# Patient Record
Sex: FEMALE | Race: WHITE | NOT HISPANIC OR LATINO | URBAN - METROPOLITAN AREA
[De-identification: names, ages, dates, MRNs, and addresses within clinical notes are randomized per-mention and may not be internally consistent; named-entity substitution may affect disease eponyms.]

---

## 2022-06-28 ENCOUNTER — EMERGENCY (EMERGENCY)
Facility: HOSPITAL | Age: 22
LOS: 1 days | Discharge: HOME | End: 2022-06-28
Admitting: EMERGENCY MEDICINE

## 2022-06-28 VITALS
WEIGHT: 220.02 LBS | HEIGHT: 67 IN | RESPIRATION RATE: 18 BRPM | SYSTOLIC BLOOD PRESSURE: 113 MMHG | HEART RATE: 103 BPM | OXYGEN SATURATION: 95 % | DIASTOLIC BLOOD PRESSURE: 74 MMHG | TEMPERATURE: 98 F

## 2022-06-28 VITALS
HEART RATE: 94 BPM | SYSTOLIC BLOOD PRESSURE: 118 MMHG | DIASTOLIC BLOOD PRESSURE: 75 MMHG | OXYGEN SATURATION: 96 % | TEMPERATURE: 98 F | RESPIRATION RATE: 17 BRPM

## 2022-06-28 PROCEDURE — 73610 X-RAY EXAM OF ANKLE: CPT | Mod: 26,RT

## 2022-06-28 PROCEDURE — 99283 EMERGENCY DEPT VISIT LOW MDM: CPT | Mod: 25

## 2022-06-28 NOTE — ED PROVIDER NOTE - OBJECTIVE STATEMENT
Patient presents with L ankle pain and abrasions after twisting L ankle while stepping on an uneven surface and falling on BL knees. denies head trauma, focal weakness. able to partial weight bear. last tetanus 3 years ago

## 2022-06-28 NOTE — ED ADULT NURSE NOTE - OBJECTIVE STATEMENT
Pt aox3 BIBEMS, R ankle pain s/p trip + swelling. no deformity, road rash on left leg. Denies hs or loc

## 2022-06-28 NOTE — ED PROVIDER NOTE - PHYSICAL EXAMINATION
superficial abrasions over the RLE, L knee, no active bleeding  R ankle: edema over the lateral malleolus, able to move all toes.   DP pulses equal, cap refill <2 sec

## 2022-06-28 NOTE — ED PROVIDER NOTE - CHIEF COMPLAINT
Transfer acceptance note from [facility] to [facility] [address]    Patient is a 26y old  Female who presents with a chief complaint of     HPI:      Allergies    No Known Allergies    Intolerances      Home Medications:      SOCIAL HX:     Smoking          ETOH/Illicit drugs          Occupation    PAST MEDICAL & SURGICAL HISTORY:  Anxiety  Depression      FAMILY HISTORY:  :    No known cardiovascular or pulmonary family history     ROS:  See HPI     PHYSICAL EXAM    ICU Vital Signs Last 24 Hrs  T(C): 36.2 (22 Aug 2019 23:30), Max: 36.9 (22 Aug 2019 18:43)  T(F): 97.2 (22 Aug 2019 23:30), Max: 98.5 (22 Aug 2019 18:43)  HR: 72 (22 Aug 2019 23:06) (67 - 89)  BP: 124/67 (22 Aug 2019 23:06) (124/67 - 138/89)  BP(mean): 88 (22 Aug 2019 23:06) (88 - 88)  ABP: --  ABP(mean): --  RR: 18 (22 Aug 2019 23:06) (16 - 18)  SpO2: 97% (22 Aug 2019 23:06) (96% - 100%)      General: Not in distress  HEENT:  MAY              Lymphatic system: No LN  Lungs: Bilateral BS  Cardiovascular: Regular  Gastrointestinal: Soft, Positive BS  Musculoskeletal: No clubbing.  Moves all extremities.    Skin: Warm.  Intact  Neurological: No motor or sensory deficit         LABS:                          14.6   6.0   )-----------( 235      ( 22 Aug 2019 19:27 )             41.5                                               08-22    141  |  104  |  15  ----------------------------<  90  3.4<L>   |  27  |  0.89    Ca    9.6      22 Aug 2019 19:27    TPro  8.1  /  Alb  4.7  /  TBili  0.6  /  DBili  x   /  AST  37  /  ALT  48<H>  /  AlkPhos  74  08-22                                             Urinalysis Basic - ( 22 Aug 2019 19:45 )    Color: Yellow / Appearance: Clear / S.025 / pH: x  Gluc: x / Ketone: NEGATIVE  / Bili: NEGATIVE / Urobili: 0.2 E.U./dL   Blood: x / Protein: 30 mg/dL / Nitrite: NEGATIVE   Leuk Esterase: NEGATIVE / RBC: < 5 /HPF / WBC < 5 /HPF   Sq Epi: x / Non Sq Epi: 5-10 /HPF / Bacteria: Present /HPF                                                  LIVER FUNCTIONS - ( 22 Aug 2019 19:27 )  Alb: 4.7 g/dL / Pro: 8.1 g/dL / ALK PHOS: 74 U/L / ALT: 48 U/L / AST: 37 U/L / GGT: x                                                                                                                                           CXR:    ECHO:    MEDICATIONS  (STANDING):  sodium chloride 0.9%. 1000 milliLiter(s) (100 mL/Hr) IV Continuous <Continuous>    MEDICATIONS  (PRN):  acetaminophen    Suspension .. 650 milliGRAM(s) Oral every 6 hours PRN Mild Pain (1 - 3)      A&P:            Pt seen and examined. Case discussed with Dr. Carlton.  Remainder of plan as discussed in H&P. Transfer acceptance note from [facility] to [facility] [address]    HPI:  Ms. Barger is a 26F with a PMH of anxiety and depression on Zoloft and Lorazepam who presents the ED for a seizure. As per patient's sister who witnessed the seizure, pt was sitting on the couch at their cousin's house and started to lean to one side and make groaning sounds, this was followed by GTC activity, pt was red then turned blue. 911 was immediately called. Pt then sat up and looked confused with difficulty speaking for approximately 10 minutes. Pt's sister is not sure of how long the seizure lasted but states pt did not hit her head and was on the couch the entire time. Pt recently stopped taking Lorazepam 1mg once/day, which she takes for anxiety, and completed her tapering regimen (Lorazepam .5mg for 1-2 weeks) 4 days ago.  Pt does not remember any thing but does remember feeling lightheaded and anxious prior to arriving her cousin's house. Of note, she has not been sleeping well for the last 2 days due to her job. She currently has a headache to the back of her head, feels anxious, and tongue pain. Denies urinary and bowel incontinence, visual and auditory hallucinations, and N/V/D, It was agreed by the patient and her psychiatrist to discontinue Lorazepam 2-3 weeks ago. She has done this in the past where she would take Lorazepam for 5 months and discontinue for a period time before starting Lorazepam again, she states feels long term use clouds her memory. Pt occasionally vapes and drinks approx 3x/week. When she drinks, she usually drinks 3 drinks but more on the weekends. Last alcoholic drink was yesterday evening where she drank 2 glasses of wine. Denies illicit drug use but does endorse eating a marijuana cookie 3 weeks ago. Pt denies a history of seizures.                        Allergies    No Known Allergies    Intolerances      Home Medications:      SOCIAL HX:     Smoking          ETOH/Illicit drugs          Occupation    PAST MEDICAL & SURGICAL HISTORY:  Anxiety  Depression      FAMILY HISTORY:  :    No known cardiovascular or pulmonary family history     ROS:  See HPI     PHYSICAL EXAM    ICU Vital Signs Last 24 Hrs  T(C): 36.2 (22 Aug 2019 23:30), Max: 36.9 (22 Aug 2019 18:43)  T(F): 97.2 (22 Aug 2019 23:30), Max: 98.5 (22 Aug 2019 18:43)  HR: 72 (22 Aug 2019 23:06) (67 - 89)  BP: 124/67 (22 Aug 2019 23:06) (124/67 - 138/89)  BP(mean): 88 (22 Aug 2019 23:06) (88 - 88)  ABP: --  ABP(mean): --  RR: 18 (22 Aug 2019 23:06) (16 - 18)  SpO2: 97% (22 Aug 2019 23:06) (96% - 100%)      General: Not in distress  HEENT:  MAY              Lymphatic system: No LN  Lungs: Bilateral BS  Cardiovascular: Regular  Gastrointestinal: Soft, Positive BS  Musculoskeletal: No clubbing.  Moves all extremities.    Skin: Warm.  Intact  Neurological: No motor or sensory deficit         LABS:                          14.6   6.0   )-----------( 235      ( 22 Aug 2019 19:27 )             41.5                                               0822    141  |  104  |  15  ----------------------------<  90  3.4<L>   |  27  |  0.89    Ca    9.6      22 Aug 2019 19:27    TPro  8.1  /  Alb  4.7  /  TBili  0.6  /  DBili  x   /  AST  37  /  ALT  48<H>  /  AlkPhos  74                                               Urinalysis Basic - ( 22 Aug 2019 19:45 )    Color: Yellow / Appearance: Clear / S.025 / pH: x  Gluc: x / Ketone: NEGATIVE  / Bili: NEGATIVE / Urobili: 0.2 E.U./dL   Blood: x / Protein: 30 mg/dL / Nitrite: NEGATIVE   Leuk Esterase: NEGATIVE / RBC: < 5 /HPF / WBC < 5 /HPF   Sq Epi: x / Non Sq Epi: 5-10 /HPF / Bacteria: Present /HPF                                                  LIVER FUNCTIONS - ( 22 Aug 2019 19:27 )  Alb: 4.7 g/dL / Pro: 8.1 g/dL / ALK PHOS: 74 U/L / ALT: 48 U/L / AST: 37 U/L / GGT: x                                                                                                                                           CXR:    ECHO:    MEDICATIONS  (STANDING):  sodium chloride 0.9%. 1000 milliLiter(s) (100 mL/Hr) IV Continuous <Continuous>    MEDICATIONS  (PRN):  acetaminophen    Suspension .. 650 milliGRAM(s) Oral every 6 hours PRN Mild Pain (1 - 3)      A&P:            Pt seen and examined. Case discussed with Dr. Carlton.  Remainder of plan as discussed in H&P. Transfer acceptance note from [facility] to [facility] [address]    HPI:  Ms. Barger is a 26F with a PMH of anxiety and depression on Zoloft and Lorazepam who presented to the ED after a witnessed seizure. Pt states that she was at her baseline when she woke up on Thursday morning. She went to work and then went to her cousin's house. Approximately 6pm on       for a seizure. As per patient's sister who witnessed the seizure, pt was sitting on the couch at their cousin's house and started to lean to one side and make groaning sounds, this was followed by GTC activity, pt was red then turned blue. 911 was immediately called. Pt then sat up and looked confused with difficulty speaking for approximately 10 minutes. Pt's sister is not sure of how long the seizure lasted but states pt did not hit her head and was on the couch the entire time. Pt recently stopped taking Lorazepam 1mg once/day, which she takes for anxiety, and completed her tapering regimen (Lorazepam .5mg for 1-2 weeks) 4 days ago.  Pt does not remember any thing but does remember feeling lightheaded and anxious prior to arriving her cousin's house. Of note, she has not been sleeping well for the last 2 days due to her job. She currently has a headache to the back of her head, feels anxious, and tongue pain. Denies urinary and bowel incontinence, visual and auditory hallucinations, and N/V/D, It was agreed by the patient and her psychiatrist to discontinue Lorazepam 2-3 weeks ago. She has done this in the past where she would take Lorazepam for 5 months and discontinue for a period time before starting Lorazepam again, she states feels long term use clouds her memory. Pt occasionally vapes and drinks approx 3x/week. When she drinks, she usually drinks 3 drinks but more on the weekends. Last alcoholic drink was yesterday evening where she drank 2 glasses of wine. Denies illicit drug use but does endorse eating a marijuana cookie 3 weeks ago. Pt denies a history of seizures.                        Allergies    No Known Allergies    Intolerances      Home Medications:      SOCIAL HX:     Smoking          ETOH/Illicit drugs          Occupation    PAST MEDICAL & SURGICAL HISTORY:  Anxiety  Depression      FAMILY HISTORY:  :    No known cardiovascular or pulmonary family history     ROS:  See HPI     PHYSICAL EXAM    ICU Vital Signs Last 24 Hrs  T(C): 36.2 (22 Aug 2019 23:30), Max: 36.9 (22 Aug 2019 18:43)  T(F): 97.2 (22 Aug 2019 23:30), Max: 98.5 (22 Aug 2019 18:43)  HR: 72 (22 Aug 2019 23:06) (67 - 89)  BP: 124/67 (22 Aug 2019 23:06) (124/67 - 138/89)  BP(mean): 88 (22 Aug 2019 23:06) (88 - 88)  ABP: --  ABP(mean): --  RR: 18 (22 Aug 2019 23:06) (16 - 18)  SpO2: 97% (22 Aug 2019 23:06) (96% - 100%)      General: Not in distress  HEENT:  MAY              Lymphatic system: No LN  Lungs: Bilateral BS  Cardiovascular: Regular  Gastrointestinal: Soft, Positive BS  Musculoskeletal: No clubbing.  Moves all extremities.    Skin: Warm.  Intact  Neurological: No motor or sensory deficit         LABS:                          14.6   6.0   )-----------( 235      ( 22 Aug 2019 19:27 )             41.5                                               08-22    141  |  104  |  15  ----------------------------<  90  3.4<L>   |  27  |  0.89    Ca    9.6      22 Aug 2019 19:27    TPro  8.1  /  Alb  4.7  /  TBili  0.6  /  DBili  x   /  AST  37  /  ALT  48<H>  /  AlkPhos  74                                               Urinalysis Basic - ( 22 Aug 2019 19:45 )    Color: Yellow / Appearance: Clear / S.025 / pH: x  Gluc: x / Ketone: NEGATIVE  / Bili: NEGATIVE / Urobili: 0.2 E.U./dL   Blood: x / Protein: 30 mg/dL / Nitrite: NEGATIVE   Leuk Esterase: NEGATIVE / RBC: < 5 /HPF / WBC < 5 /HPF   Sq Epi: x / Non Sq Epi: 5-10 /HPF / Bacteria: Present /HPF                                                  LIVER FUNCTIONS - ( 22 Aug 2019 19:27 )  Alb: 4.7 g/dL / Pro: 8.1 g/dL / ALK PHOS: 74 U/L / ALT: 48 U/L / AST: 37 U/L / GGT: x                                                                                                                                           CXR:    ECHO:    MEDICATIONS  (STANDING):  sodium chloride 0.9%. 1000 milliLiter(s) (100 mL/Hr) IV Continuous <Continuous>    MEDICATIONS  (PRN):  acetaminophen    Suspension .. 650 milliGRAM(s) Oral every 6 hours PRN Mild Pain (1 - 3)      A&P:            Pt seen and examined. Case discussed with Dr. Carlton.  Remainder of plan as discussed in H&P. Transfer acceptance note from [facility] to [facility] [address]    HPI:  Ms. Barger is a 26F with a PMH of anxiety, depression, EtOH abuse hx on Zoloft and Lorazepam who presented to the ED after a witnessed seizure.  Pt states that she was at her baseline when she woke up on Thursday morning. She went to work and then went to her cousin's house. Approximately 6pm on  she stated that she felt strange and then had a witnessed tonic clonic seizure which lasted for approx 5 min with a 10 min post ictal period. She endorses tongue biting (denies blood in her mouth). Denies fecal/urinary incontinence, LOC or hitting her head. Pt does not recall the details of her seizure; details of seizure were confirmed by her sister who witnessed the seizure and was at bedside during the interview. Pt's sister states that pt was on the couch, leaned to one side, with tonic clonic movements in her bilateral upper and lower extremities, during which her face turned blue. She did not hit her head during the seizure. Pt was post ictal for approximately 10 min (confusion and word finding difficulties) EMS was called immediately and pt was brought to [facility]. Of note, pt had been taking Lorazepam 0.5 - 1mg PRN at bedtime for the past 5 months, but recently stopped and started a self taper. Her last dose of Lorazepam 0.5 mg was . She endorses regular EtOH use. She drinks 6-8 glasses of wine each weekend night and approx 2 glasses of wine, 2 weekday nights. Her last drink of EtoH was 7pm . She vapes and endorses occasional marijuana use. She does not have a seizure hx and has not had a seizure in the past. Pt transferred to [address] [facility] for post seizure monitoring.    [facility] vitals: T: 98.5, HR: 89, BP: 138/89, RR: 18, SpO2: 96% RA, CIWA 11, repeat CIWA 7  [facility] meds: Librium 50 mg, Ativan 0.5 mg x2  [facility] imaging: CT head negative      Allergies    No Known Allergies    Intolerances      Home Medications:      SOCIAL HX:     Smoking: vapes         ETOH/Illicit drugs: 6-8 glasses of wine each weekend night and approx 2 glasses of wine, 2 weekday nights. Her last drink of EtoH was 7pm ; occasional marijuana use          Occupation: [employer]    PAST MEDICAL & SURGICAL HISTORY:  Anxiety  Depression      FAMILY HISTORY:  :    No known cardiovascular or pulmonary family history     ROS:  See HPI     PHYSICAL EXAM    ICU Vital Signs Last 24 Hrs  T(C): 36.2 (22 Aug 2019 23:30), Max: 36.9 (22 Aug 2019 18:43)  T(F): 97.2 (22 Aug 2019 23:30), Max: 98.5 (22 Aug 2019 18:43)  HR: 72 (22 Aug 2019 23:06) (67 - 89)  BP: 124/67 (22 Aug 2019 23:06) (124/67 - 138/89)  BP(mean): 88 (22 Aug 2019 23:06) (88 - 88)  ABP: --  ABP(mean): --  RR: 18 (22 Aug 2019 23:06) (16 - 18)  SpO2: 97% (22 Aug 2019 23:06) (96% - 100%)      General: Not in distress  HEENT:  PERRLA              Lymphatic system: No LN  Lungs: Bilateral BS  Cardiovascular: Regular  Gastrointestinal: Soft, Positive BS  Musculoskeletal: No clubbing.  Moves all extremities.    Skin: Warm.  Intact  Neurological: No motor or sensory deficit         LABS:                          14.6   6.0   )-----------( 235      ( 22 Aug 2019 19:27 )             41.5                                               -22    141  |  104  |  15  ----------------------------<  90  3.4<L>   |  27  |  0.89    Ca    9.6      22 Aug 2019 19:27    TPro  8.1  /  Alb  4.7  /  TBili  0.6  /  DBili  x   /  AST  37  /  ALT  48<H>  /  AlkPhos  74  -                                             Urinalysis Basic - ( 22 Aug 2019 19:45 )    Color: Yellow / Appearance: Clear / S.025 / pH: x  Gluc: x / Ketone: NEGATIVE  / Bili: NEGATIVE / Urobili: 0.2 E.U./dL   Blood: x / Protein: 30 mg/dL / Nitrite: NEGATIVE   Leuk Esterase: NEGATIVE / RBC: < 5 /HPF / WBC < 5 /HPF   Sq Epi: x / Non Sq Epi: 5-10 /HPF / Bacteria: Present /HPF                                                  LIVER FUNCTIONS - ( 22 Aug 2019 19:27 )  Alb: 4.7 g/dL / Pro: 8.1 g/dL / ALK PHOS: 74 U/L / ALT: 48 U/L / AST: 37 U/L / GGT: x                                                                                                   CT head: no acute findings                                        MEDICATIONS  (STANDING):  sodium chloride 0.9%. 1000 milliLiter(s) (100 mL/Hr) IV Continuous <Continuous>    MEDICATIONS  (PRN):  acetaminophen    Suspension .. 650 milliGRAM(s) Oral every 6 hours PRN Mild Pain (1 - 3)      A&P:    26F with a PMH of anxiety, depression, EtOH abuse hx on Zoloft and Lorazepam who presented to the ED after a witnessed GTC seizure.    #Seizure  -Pt with witnessed GTC in setting of self taper of benzodiazepines in setting of EtOH abuse  - now s/p Librium 50 mg, Ativan 0.5 mg x2  - Pt without prior seizure hx  - Etiology of seizure is likely benzodiazepine taper in setting of EtOH abuse, which resulted in decreased seizure threshold  -           Pt seen and examined. Case discussed with Dr. Carlton.  Remainder of plan as discussed in H&P. Transfer acceptance note from [facility] to [facility] [address]    HPI:  Ms. Barger is a 26F with a PMH of anxiety, depression, EtOH abuse hx on Zoloft and Lorazepam who presented to the ED after a witnessed seizure.  Pt states that she was at her baseline when she woke up on Thursday morning. She went to work and then went to her cousin's house. Approximately 6pm on  she stated that she felt strange and then had a witnessed tonic clonic seizure which lasted for approx 5 min with a 10 min post ictal period. She endorses tongue biting (denies blood in her mouth). Denies fecal/urinary incontinence, LOC or hitting her head. Pt does not recall the details of her seizure; details of seizure were confirmed by her sister who witnessed the seizure and was at bedside during the interview. Pt's sister states that pt was on the couch, leaned to one side, with tonic clonic movements in her bilateral upper and lower extremities, during which her face turned blue. She did not hit her head during the seizure. Pt was post ictal for approximately 10 min (confusion and word finding difficulties) EMS was called immediately and pt was brought to [facility]. Of note, pt had been taking Lorazepam 0.5 - 1mg PRN at bedtime for the past 5 months, but recently stopped and started a self taper. Her last dose of Lorazepam 0.5 mg was . She endorses regular EtOH use. She drinks 6-8 glasses of wine each weekend night and approx 2 glasses of wine, 2 weekday nights. Her last drink of EtoH was 7pm . She vapes and endorses occasional marijuana use. She does not have a seizure hx and has not had a seizure in the past. Pt transferred to [address] [facility] for post seizure monitoring.    [facility] vitals: T: 98.5, HR: 89, BP: 138/89, RR: 18, SpO2: 96% RA, CIWA 11, repeat CIWA 7  [facility] meds: Librium 50 mg, Ativan 0.5 mg x2  [facility] imaging: CT head negative      Allergies    No Known Allergies    Intolerances      Home Medications:      SOCIAL HX:     Smoking: vapes         ETOH/Illicit drugs: 6-8 glasses of wine each weekend night and approx 2 glasses of wine, 2 weekday nights. Her last drink of EtoH was 7pm ; occasional marijuana use          Occupation: [employer]    PAST MEDICAL & SURGICAL HISTORY:  Anxiety  Depression      FAMILY HISTORY:  :    No known cardiovascular or pulmonary family history     ROS:  See HPI     PHYSICAL EXAM    ICU Vital Signs Last 24 Hrs  T(C): 36.2 (22 Aug 2019 23:30), Max: 36.9 (22 Aug 2019 18:43)  T(F): 97.2 (22 Aug 2019 23:30), Max: 98.5 (22 Aug 2019 18:43)  HR: 72 (22 Aug 2019 23:06) (67 - 89)  BP: 124/67 (22 Aug 2019 23:06) (124/67 - 138/89)  BP(mean): 88 (22 Aug 2019 23:06) (88 - 88)  ABP: --  ABP(mean): --  RR: 18 (22 Aug 2019 23:06) (16 - 18)  SpO2: 97% (22 Aug 2019 23:06) (96% - 100%)      General: Not in distress, CIWA = 0  HEENT:  PERRLA              Lymphatic system: No LN  Lungs: Bilateral BS  Cardiovascular: Regular  Gastrointestinal: Soft, Positive BS  Musculoskeletal: No clubbing.  Moves all extremities.    Skin: Warm.  Intact  Neurological: No motor or sensory deficit, AAOx3, CIWA 0         LABS:                          14.6   6.0   )-----------( 235      ( 22 Aug 2019 19:27 )             41.5                                               08-22    141  |  104  |  15  ----------------------------<  90  3.4<L>   |  27  |  0.89    Ca    9.6      22 Aug 2019 19:27    TPro  8.1  /  Alb  4.7  /  TBili  0.6  /  DBili  x   /  AST  37  /  ALT  48<H>  /  AlkPhos  74  08-                                             Urinalysis Basic - ( 22 Aug 2019 19:45 )    Color: Yellow / Appearance: Clear / S.025 / pH: x  Gluc: x / Ketone: NEGATIVE  / Bili: NEGATIVE / Urobili: 0.2 E.U./dL   Blood: x / Protein: 30 mg/dL / Nitrite: NEGATIVE   Leuk Esterase: NEGATIVE / RBC: < 5 /HPF / WBC < 5 /HPF   Sq Epi: x / Non Sq Epi: 5-10 /HPF / Bacteria: Present /HPF                                                  LIVER FUNCTIONS - ( 22 Aug 2019 19:27 )  Alb: 4.7 g/dL / Pro: 8.1 g/dL / ALK PHOS: 74 U/L / ALT: 48 U/L / AST: 37 U/L / GGT: x                                                                                                   CT head: no acute findings                                        MEDICATIONS  (STANDING):  sodium chloride 0.9%. 1000 milliLiter(s) (100 mL/Hr) IV Continuous <Continuous>    MEDICATIONS  (PRN):  acetaminophen    Suspension .. 650 milliGRAM(s) Oral every 6 hours PRN Mild Pain (1 - 3)      A&P:    26F with a PMH of anxiety, depression, EtOH abuse hx on Zoloft and Lorazepam who presented to the ED after a witnessed GTC seizure, admitted [facility] [address] for post seizure monitoring in setting of EtOH abuse.    #Seizure  - Pt with witnessed GTC in setting of self taper of benzodiazepines in setting of EtOH abuse  - now s/p Librium 50 mg, [facility], repeat CIWA 7  - Pt without prior seizure hx  - DDx: likely benzodiazepine taper in setting of EtOH abuse, which resulted in decreased seizure threshold  - Currently AAOx3, CIWA 0  - Ativan 1mg PRN for seizures  - c/w tele monitoring overnight, can step down to floors if stable in AM   - c/w NS at 100 cc/hr    Pt seen and examined. Case discussed with Dr. Carlton. Remainder of plan as discussed in H&P. Transfer acceptance note from [facility] to [facility] [address]    HPI:  Ms. Barger is a 26F with a PMH of anxiety, depression, EtOH abuse hx on Zoloft and Lorazepam who presented to the ED after a witnessed seizure.  Pt states that she was at her baseline when she woke up on Thursday morning. She went to work and then went to her cousin's house. Approximately 6pm on  she stated that she felt strange and then had a witnessed tonic clonic seizure which lasted for approx 5 min with a 10 min post ictal period. She endorses tongue biting (denies blood in her mouth). Denies fecal/urinary incontinence, LOC or hitting her head. Pt does not recall the details of her seizure; details of seizure were confirmed by her sister who witnessed the seizure and was at bedside during the interview. Pt's sister states that pt was on the couch, leaned to one side, with tonic clonic movements in her bilateral upper and lower extremities, during which her face turned blue. She did not hit her head during the seizure. Pt was post ictal for approximately 10 min (confusion and word finding difficulties) EMS was called immediately and pt was brought to [facility]. Of note, pt had been taking Lorazepam 0.5 - 1mg PRN at bedtime for the past 5 months, but recently stopped and started a self taper. Her last dose of Lorazepam 0.5 mg was . She endorses regular EtOH use. She drinks 6-8 glasses of wine each weekend night and approx 2 glasses of wine, 2 weekday nights. Her last drink of EtoH was 7pm . She vapes and endorses occasional marijuana use. She does not have a seizure hx and has not had a seizure in the past. Pt transferred to [facility] for post seizure monitoring.    [facility] vitals: T: 98.5, HR: 89, BP: 138/89, RR: 18, SpO2: 96% RA, CIWA 11, repeat CIWA 7  [facility] meds: Librium 50 mg, Ativan 0.5 mg x2  [facility] imaging: CT head negative      Allergies    No Known Allergies    Intolerances      Home Medications:      SOCIAL HX:     Smoking: vapes         ETOH/Illicit drugs: 6-8 glasses of wine each weekend night and approx 2 glasses of wine, 2 weekday nights. Her last drink of EtoH was 7pm ; occasional marijuana use          Occupation: [email or web address]    PAST MEDICAL & SURGICAL HISTORY:  Anxiety  Depression      FAMILY HISTORY:  :    No known cardiovascular or pulmonary family history     ROS:  See HPI     PHYSICAL EXAM    ICU Vital Signs Last 24 Hrs  T(C): 36.2 (22 Aug 2019 23:30), Max: 36.9 (22 Aug 2019 18:43)  T(F): 97.2 (22 Aug 2019 23:30), Max: 98.5 (22 Aug 2019 18:43)  HR: 72 (22 Aug 2019 23:06) (67 - 89)  BP: 124/67 (22 Aug 2019 23:06) (124/67 - 138/89)  BP(mean): 88 (22 Aug 2019 23:06) (88 - 88)  ABP: --  ABP(mean): --  RR: 18 (22 Aug 2019 23:06) (16 - 18)  SpO2: 97% (22 Aug 2019 23:06) (96% - 100%)      General: Not in distress, CIWA = 0  HEENT:  PERRLA              Lymphatic system: No LN  Lungs: Bilateral BS  Cardiovascular: Regular  Gastrointestinal: Soft, Positive BS  Musculoskeletal: No clubbing.  Moves all extremities.    Skin: Warm.  Intact  Neurological: No motor or sensory deficit, AAOx3, CIWA 0         LABS:                          14.6   6.0   )-----------( 235      ( 22 Aug 2019 19:27 )             41.5                                               08-22    141  |  104  |  15  ----------------------------<  90  3.4<L>   |  27  |  0.89    Ca    9.6      22 Aug 2019 19:27    TPro  8.1  /  Alb  4.7  /  TBili  0.6  /  DBili  x   /  AST  37  /  ALT  48<H>  /  AlkPhos  74  08-                                             Urinalysis Basic - ( 22 Aug 2019 19:45 )    Color: Yellow / Appearance: Clear / S.025 / pH: x  Gluc: x / Ketone: NEGATIVE  / Bili: NEGATIVE / Urobili: 0.2 E.U./dL   Blood: x / Protein: 30 mg/dL / Nitrite: NEGATIVE   Leuk Esterase: NEGATIVE / RBC: < 5 /HPF / WBC < 5 /HPF   Sq Epi: x / Non Sq Epi: 5-10 /HPF / Bacteria: Present /HPF                                                  LIVER FUNCTIONS - ( 22 Aug 2019 19:27 )  Alb: 4.7 g/dL / Pro: 8.1 g/dL / ALK PHOS: 74 U/L / ALT: 48 U/L / AST: 37 U/L / GGT: x                                                                                                   CT head: no acute findings                                        MEDICATIONS  (STANDING):  sodium chloride 0.9%. 1000 milliLiter(s) (100 mL/Hr) IV Continuous <Continuous>    MEDICATIONS  (PRN):  acetaminophen    Suspension .. 650 milliGRAM(s) Oral every 6 hours PRN Mild Pain (1 - 3)      A&P:    26F with a PMH of anxiety, depression, EtOH abuse hx on Zoloft and Lorazepam who presented to the ED after a witnessed GTC seizure, admitted [facility] for post seizure monitoring in setting of EtOH abuse.    #Seizure  - Pt with witnessed GTC in setting of self taper of benzodiazepines in setting of EtOH abuse  - now s/p Librium 50 mg, [facility], repeat CIWA 7  - Pt without prior seizure hx  - DDx: likely benzodiazepine taper in setting of EtOH abuse, which resulted in decreased seizure threshold  - Currently AAOx3, CIWA 0  - currently stable and without need for CIWA protocol  - Ativan 1mg PRN for seizures  - c/w tele monitoring overnight, can step down to floors if stable in AM   - c/w NS at 100 cc/hr    Pt seen and examined. Case discussed with Dr. Carlton. Remainder of plan as discussed in H&P. The patient is a 22y Female complaining of ankle pain/injury.

## 2022-06-28 NOTE — ED PROVIDER NOTE - CLINICAL SUMMARY MEDICAL DECISION MAKING FREE TEXT BOX
Patient presents with R ankle pain after twist and fall. xray WNL. given ace wrap and crutches. advise NSAIDs, RICE, follow up with ortho as needed.

## 2022-06-28 NOTE — ED PROVIDER NOTE - PATIENT PORTAL LINK FT
You can access the FollowMyHealth Patient Portal offered by [facility] by registering at the following website: [email or web address]. By joining [email or web address]’s FollowMyHealth portal, you will also be able to view your health information using other applications (apps) compatible with our system.

## 2022-06-30 DIAGNOSIS — S93.401A SPRAIN OF UNSPECIFIED LIGAMENT OF RIGHT ANKLE, INITIAL ENCOUNTER: ICD-10-CM

## 2022-06-30 DIAGNOSIS — M25.572 PAIN IN LEFT ANKLE AND JOINTS OF LEFT FOOT: ICD-10-CM
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
## 2022-06-30 DIAGNOSIS — S90.512A ABRASION, LEFT ANKLE, INITIAL ENCOUNTER: ICD-10-CM
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
